# Patient Record
Sex: FEMALE | Race: WHITE | NOT HISPANIC OR LATINO | URBAN - METROPOLITAN AREA
[De-identification: names, ages, dates, MRNs, and addresses within clinical notes are randomized per-mention and may not be internally consistent; named-entity substitution may affect disease eponyms.]

---

## 2020-07-25 ENCOUNTER — TELEPHONE ENCOUNTER (OUTPATIENT)
Dept: URBAN - METROPOLITAN AREA CLINIC 13 | Facility: CLINIC | Age: 35
End: 2020-07-25

## 2020-07-25 RX ORDER — NATALIZUMAB 300 MG/15ML
USE AS DIRECTED INJECTION INTRAVENOUS
Refills: 0 | OUTPATIENT
End: 2019-07-31

## 2020-07-25 RX ORDER — ESCITALOPRAM 10 MG/1
TAKE TABLET  PRN TABLET, FILM COATED ORAL
Refills: 0 | OUTPATIENT
Start: 2019-01-03 | End: 2019-07-31

## 2020-07-26 ENCOUNTER — TELEPHONE ENCOUNTER (OUTPATIENT)
Dept: URBAN - METROPOLITAN AREA CLINIC 13 | Facility: CLINIC | Age: 35
End: 2020-07-26

## 2020-07-26 RX ORDER — AMOXICILLIN 875 MG/1
TABLET, FILM COATED ORAL
Qty: 20 | Refills: 0 | Status: ACTIVE | COMMUNITY
Start: 2018-03-24

## 2020-07-26 RX ORDER — TERIFLUNOMIDE 14 MG/1
TAKE 1 TABLET DAILY TABLET, FILM COATED ORAL
Refills: 0 | Status: ACTIVE | COMMUNITY
Start: 2019-07-26

## 2020-07-26 RX ORDER — OSELTAMIVIR PHOSPHATE 75 MG/1
CAPSULE ORAL
Qty: 10 | Refills: 0 | Status: ACTIVE | COMMUNITY
Start: 2018-02-06

## 2020-07-26 RX ORDER — CEPHALEXIN 500 MG/1
CAPSULE ORAL
Qty: 6 | Refills: 0 | Status: ACTIVE | COMMUNITY
Start: 2018-07-12

## 2020-07-26 RX ORDER — ETONOGESTREL AND ETHINYL ESTRADIOL .12; .015 MG/D; MG/D
INSERT, EXTENDED RELEASE VAGINAL
Qty: 1 | Refills: 0 | Status: ACTIVE | COMMUNITY
Start: 2018-02-24

## 2020-07-26 RX ORDER — CYCLOBENZAPRINE HYDROCHLORIDE 5 MG/1
TAKE TABLET  PRN TABLET, FILM COATED ORAL
Refills: 0 | Status: ACTIVE | COMMUNITY
Start: 2019-01-03

## 2020-07-26 RX ORDER — BROMPHENIRAMINE MALEATE, PSEUDOEPHEDRINE HYDROCHLORIDE, 2; 30; 10 MG/5ML; MG/5ML; MG/5ML
SYRUP ORAL
Qty: 120 | Refills: 0 | Status: ACTIVE | COMMUNITY
Start: 2018-02-06

## 2020-07-26 RX ORDER — DICYCLOMINE HYDROCHLORIDE 10 MG/1
TAKE 1 CAPSULE 3 TIMES DAILY CAPSULE ORAL
Qty: 120 | Refills: 0 | Status: ACTIVE | COMMUNITY
Start: 2019-01-23

## 2020-07-26 RX ORDER — MELOXICAM 7.5 MG/1
TABLET ORAL
Qty: 30 | Refills: 0 | Status: ACTIVE | COMMUNITY
Start: 2018-05-03

## 2020-07-26 RX ORDER — LEVONORGESTREL 19.5 MG/1
INTRAUTERINE DEVICE INTRAUTERINE
Qty: 1 | Refills: 0 | Status: ACTIVE | COMMUNITY
Start: 2018-02-26

## 2020-07-26 RX ORDER — LORAZEPAM 0.5 MG/1
TAKE TABLET  PRN TABLET ORAL
Refills: 0 | Status: ACTIVE | COMMUNITY
Start: 2019-01-03

## 2020-12-18 ENCOUNTER — OFFICE VISIT (OUTPATIENT)
Dept: URBAN - METROPOLITAN AREA CLINIC 113 | Facility: CLINIC | Age: 35
End: 2020-12-18

## 2025-04-15 ENCOUNTER — OFFICE VISIT (OUTPATIENT)
Dept: URBAN - METROPOLITAN AREA CLINIC 113 | Facility: CLINIC | Age: 40
End: 2025-04-15
Payer: COMMERCIAL

## 2025-04-15 ENCOUNTER — DASHBOARD ENCOUNTERS (OUTPATIENT)
Age: 40
End: 2025-04-15

## 2025-04-15 DIAGNOSIS — K21.9 GASTROESOPHAGEAL REFLUX DISEASE, UNSPECIFIED WHETHER ESOPHAGITIS PRESENT: ICD-10-CM

## 2025-04-15 DIAGNOSIS — D49.0 IPMN (INTRADUCTAL PAPILLARY MUCINOUS NEOPLASM): ICD-10-CM

## 2025-04-15 DIAGNOSIS — K59.09 CHRONIC CONSTIPATION: ICD-10-CM

## 2025-04-15 PROBLEM — 235595009: Status: ACTIVE | Noted: 2025-04-15

## 2025-04-15 PROCEDURE — 99203 OFFICE O/P NEW LOW 30 MIN: CPT

## 2025-04-15 RX ORDER — OSELTAMIVIR PHOSPHATE 75 MG/1
CAPSULE ORAL
Qty: 10 | Refills: 0 | Status: DISCONTINUED | COMMUNITY
Start: 2018-02-06

## 2025-04-15 RX ORDER — AMOXICILLIN 875 MG/1
TABLET, FILM COATED ORAL
Qty: 20 | Refills: 0 | Status: DISCONTINUED | COMMUNITY
Start: 2018-03-24

## 2025-04-15 RX ORDER — LEVONORGESTREL 19.5 MG/1
INTRAUTERINE DEVICE INTRAUTERINE
Qty: 1 | Refills: 0 | Status: DISCONTINUED | COMMUNITY
Start: 2018-02-26

## 2025-04-15 RX ORDER — FAMOTIDINE 40 MG/1
1 TABLET TABLET, FILM COATED ORAL TWICE A DAY
Status: ACTIVE | COMMUNITY

## 2025-04-15 RX ORDER — ETONOGESTREL AND ETHINYL ESTRADIOL .12; .015 MG/D; MG/D
INSERT, EXTENDED RELEASE VAGINAL
Qty: 1 | Refills: 0 | Status: DISCONTINUED | COMMUNITY
Start: 2018-02-24

## 2025-04-15 RX ORDER — BUSPIRONE HYDROCHLORIDE 5 MG/1
1 TABLET TABLET ORAL TWICE A DAY
Status: ACTIVE | COMMUNITY

## 2025-04-15 RX ORDER — MELOXICAM 7.5 MG/1
TABLET ORAL
Qty: 30 | Refills: 0 | Status: DISCONTINUED | COMMUNITY
Start: 2018-05-03

## 2025-04-15 RX ORDER — BACLOFEN 10 MG/1
1 TABLET AS NEEDED TABLET ORAL TWICE A DAY
Status: ACTIVE | COMMUNITY

## 2025-04-15 RX ORDER — OFATUMUMAB 20 MG/.4ML
AS DIRECTED INJECTION, SOLUTION SUBCUTANEOUS
Status: ACTIVE | COMMUNITY

## 2025-04-15 RX ORDER — LORAZEPAM 0.5 MG/1
TAKE TABLET  PRN TABLET ORAL
Refills: 0 | Status: DISCONTINUED | COMMUNITY
Start: 2019-01-03

## 2025-04-15 RX ORDER — DICYCLOMINE HYDROCHLORIDE 10 MG/1
TAKE 1 CAPSULE 3 TIMES DAILY CAPSULE ORAL
Qty: 120 | Refills: 0 | Status: DISCONTINUED | COMMUNITY
Start: 2019-01-23

## 2025-04-15 RX ORDER — CEPHALEXIN 500 MG/1
CAPSULE ORAL
Qty: 6 | Refills: 0 | Status: DISCONTINUED | COMMUNITY
Start: 2018-07-12

## 2025-04-15 RX ORDER — BROMPHENIRAMINE MALEATE, PSEUDOEPHEDRINE HYDROCHLORIDE, 2; 30; 10 MG/5ML; MG/5ML; MG/5ML
SYRUP ORAL
Qty: 120 | Refills: 0 | Status: DISCONTINUED | COMMUNITY
Start: 2018-02-06

## 2025-04-15 RX ORDER — TERIFLUNOMIDE 14 MG/1
TAKE 1 TABLET DAILY TABLET, FILM COATED ORAL
Refills: 0 | Status: DISCONTINUED | COMMUNITY
Start: 2019-07-26

## 2025-04-15 RX ORDER — CETIRIZINE HYDROCHLORIDE 10 MG/1
1 TABLET TABLET, FILM COATED ORAL ONCE A DAY
Status: ACTIVE | COMMUNITY

## 2025-04-15 RX ORDER — CYCLOBENZAPRINE HYDROCHLORIDE 5 MG/1
TAKE TABLET  PRN TABLET, FILM COATED ORAL
Refills: 0 | Status: DISCONTINUED | COMMUNITY
Start: 2019-01-03

## 2025-04-15 NOTE — HPI-TODAY'S VISIT:
39-year-old female with a history of multiple sclerosis presents for evaluation of pancreatic lesion.  She has been followed by Dr. Pugh in the past for IBS and elevated liver enzymes.  Her IBS was controlled on a regimen of MiraLAX as needed, dicyclomine as needed, Gas-X as needed and dietary modifications.  Her elevated liver enzymes were reportedly due to one of her multiple sclerosis medications, Aubagio.  She has been lost to follow-up since.  She presented to [facility] on 3/21/2025 with complaints of abdominal pain that radiated into her back and throat.  Labs noted full CBC, normal CMP, normal lipase.  CT scan of the abdomen/pelvis with contrast noted normal liver, normal gallbladder, a 7 mm hypodensity in the uncinate process of the pancreas without pancreatic ductal dilation and moderate stool burden with more stool noted in the proximal colon.  IPMN was suspected and a follow-up MRI/MRCP was recommended in 6 months.  She had epigastric pain that radiates to her left neck and to her bid upper back. This lasted 72 hours. It felt like hunger pains. She has never had a pain like this. She had eaten within the house prior to her pain onset. There was no nausea or vomiting. Her grandmother did have pancreatic cancer, she was diagnosed in her 50s. Denies yellowing of eyes or skin. Denies blood per rectum or melena. Her pain did not improve with a bowel movement. After her pain stopped on Sunday, her pain flared up with stress. She will take NSAIDs on occasion. No recent travel. She does have constipation predominant IBS. She takes MiraLAX as needed. She typically has a BM daily when drinking coffee.  She admits to increased stress of late due to her cat's illness. Her cat has been at the [facility] for a week due to kidney stones.  She takes Famotidine 40 mg BID for reflux and for her severe allergies. She works at [employer] on [employer].